# Patient Record
Sex: FEMALE | Race: AMERICAN INDIAN OR ALASKA NATIVE
[De-identification: names, ages, dates, MRNs, and addresses within clinical notes are randomized per-mention and may not be internally consistent; named-entity substitution may affect disease eponyms.]

---

## 2018-01-05 ENCOUNTER — HOSPITAL ENCOUNTER (OUTPATIENT)
Dept: HOSPITAL 5 - TRG | Age: 43
Discharge: HOME | End: 2018-01-05
Attending: OBSTETRICS & GYNECOLOGY
Payer: COMMERCIAL

## 2018-01-05 VITALS — DIASTOLIC BLOOD PRESSURE: 68 MMHG | SYSTOLIC BLOOD PRESSURE: 114 MMHG

## 2018-01-05 DIAGNOSIS — Z3A.37: ICD-10-CM

## 2018-01-05 DIAGNOSIS — O47.1: ICD-10-CM

## 2018-01-05 DIAGNOSIS — O09.523: Primary | ICD-10-CM

## 2018-01-05 PROCEDURE — 59025 FETAL NON-STRESS TEST: CPT

## 2018-01-05 PROCEDURE — 76815 OB US LIMITED FETUS(S): CPT

## 2018-01-05 PROCEDURE — 76819 FETAL BIOPHYS PROFIL W/O NST: CPT

## 2018-01-05 NOTE — ULTRASOUND REPORT
FINAL REPORT



EXAM:  US OB FETAL BPP WO NON-STRESS



HISTORY:  BPP 



TECHNIQUE:  Ultrasound examination of the gravid uterus for

biophysical profile evaluation of the fetus 



PRIORS:  None.



FINDINGS:  

There is a single viable intrauterine pregnancy with documented

cardiac activity. The amniotic fluid volume is normal.



Fetal heart rate:  123 bpm



Amniotic fluid: Single pocket with 6.5 cm vertical axis 



Fetal position:  Cephalic



Evaluation for biophysical profile yields the following score as

reported by technologist from real-time exam:



Fetal respiratory motion (minimum one episode):   2 



Gross body movement (minimum 3 movements):  2 



Fetal tone (minimum one flexion and extension):  2 



Amniotic fluid volume (at least 2 cm pocket in vertical

diameter): 2 



IMPRESSION:  

Single viable intrauterine pregnancy with 8/8 biophysical profile

score during the sonographic evaluation

## 2018-01-05 NOTE — ULTRASOUND REPORT
FINAL REPORT



EXAM:  US OB LIMITED



HISTORY:  SILVIO , reported gestational age 37 weeks 6 days 



TECHNIQUE:   Ultrasound evaluation of the gravid uterus 



PRIORS:  Biophysical profile 1/5/2018



FINDINGS:  

There is a single viable intrauterine pregnancy with documented

cardiac activity.  The quantity of visualized amniotic fluid

appears grossly normal.  



Heart rate:  131 beats per minute



Fetal position:  Cephalic



Amniotic fluid index:  20.0cm



IMPRESSION:  

Normal amniotic fluid index at 20.0 cm

## 2018-01-11 ENCOUNTER — HOSPITAL ENCOUNTER (INPATIENT)
Dept: HOSPITAL 5 - TRG | Age: 43
LOS: 2 days | Discharge: HOME | End: 2018-01-13
Attending: OBSTETRICS & GYNECOLOGY | Admitting: OBSTETRICS & GYNECOLOGY
Payer: COMMERCIAL

## 2018-01-11 DIAGNOSIS — Z3A.38: ICD-10-CM

## 2018-01-11 DIAGNOSIS — Z23: ICD-10-CM

## 2018-01-11 LAB
HCT VFR BLD CALC: 30.3 % (ref 30.3–42.9)
HCT VFR BLD CALC: 31.6 % (ref 30.3–42.9)
HGB BLD-MCNC: 10.6 GM/DL (ref 10.1–14.3)
HGB BLD-MCNC: 9.9 GM/DL (ref 10.1–14.3)
MCH RBC QN AUTO: 29 PG (ref 28–32)
MCHC RBC AUTO-ENTMCNC: 34 % (ref 30–34)
MCV RBC AUTO: 87 FL (ref 79–97)
PLATELET # BLD: 256 K/MM3 (ref 140–440)
RBC # BLD AUTO: 3.64 M/MM3 (ref 3.65–5.03)

## 2018-01-11 PROCEDURE — 36415 COLL VENOUS BLD VENIPUNCTURE: CPT

## 2018-01-11 PROCEDURE — 90715 TDAP VACCINE 7 YRS/> IM: CPT

## 2018-01-11 PROCEDURE — 90471 IMMUNIZATION ADMIN: CPT

## 2018-01-11 PROCEDURE — 85018 HEMOGLOBIN: CPT

## 2018-01-11 PROCEDURE — 85027 COMPLETE CBC AUTOMATED: CPT

## 2018-01-11 PROCEDURE — 86901 BLOOD TYPING SEROLOGIC RH(D): CPT

## 2018-01-11 PROCEDURE — 85014 HEMATOCRIT: CPT

## 2018-01-11 PROCEDURE — 86900 BLOOD TYPING SEROLOGIC ABO: CPT

## 2018-01-11 PROCEDURE — 86850 RBC ANTIBODY SCREEN: CPT

## 2018-01-11 RX ADMIN — SODIUM CHLORIDE, SODIUM LACTATE, POTASSIUM CHLORIDE, AND CALCIUM CHLORIDE SCH MLS/HR: .6; .31; .03; .02 INJECTION, SOLUTION INTRAVENOUS at 23:30

## 2018-01-11 RX ADMIN — SODIUM CHLORIDE, SODIUM LACTATE, POTASSIUM CHLORIDE, AND CALCIUM CHLORIDE SCH MLS/HR: .6; .31; .03; .02 INJECTION, SOLUTION INTRAVENOUS at 18:41

## 2018-01-11 NOTE — EVENT NOTE
Date: 01/11/18


Patient labor progressed slowly cervix now 7 cm.  Patient was only 6 cm 

approximately 3 hours ago at that point had a conversation with start Pitocin 

patient at that time declined had discussion with the risks of infection 

especially in the presence of a group B streptococcus patient has agreed to 

allow me to stop the Pitocin augmented.

## 2018-01-11 NOTE — HISTORY AND PHYSICAL REPORT
History of Present Illness


Date of examination: 18


Date of admission: 


18 16:35





History of present illness: 


Patient presented to labor and delivery with complaints of regular 

contractions.  Initial exam in triage revealed the patient will 4 cm dilated 

been admitted for active labor





Menstrual History 


Regularity: regular


Menses every: 26-28 days


Duration: 3


LMP: 04/15/2017


LMP reliability: month known


LMP character: normal


Pregnancy test type: urine test


BC at conception: none


Planned pregnancy? no





EDC Calculations 


LMP: 2018





EDC Confirmation:  2018








Past Pregnancy History 


   :      5


   Term Births:   2


   Living Children:   2


   Para:      2


   Aborta:      2


   Elect. Ab:      2





Pregnancy # 1


   Delivery type:     EAB





Pregnancy # 2


   Delivery type:     EAB





Pregnancy # 3


   Delivery date:     2005


   Weeks Gestation:   40


    labor:     no


   Delivery type:     


   Anesthesia type:     epidural


   Delivery location:     [facility]


   Infant Sex:      Female


   Birth weight:   6-4





Pregnancy # 4


   Delivery date:     2014


   Weeks Gestation:   40


    labor:     no


   Delivery type:     


   Delivery location:     [place]


   Infant Sex:      Female


   Birth weight:   7-9








Past Medical History:


   Negative Past Medical History





Past Surgical History:


   D&C x2





Past Medical History 


Anesthesia Complications: negative


Anemia: negative


Autoimmune Disorder: negative


Bleeding Disorder: negative


Blood Transfusions: negative


Breast Disease: negative


Diabetes: negative


Heart Disease: negative


Hypertension: negative


Hepatitis/Liver Disease: negative


Kidney Disease/UTI: negative


Neurologic/Epilepsy/Migraines: negative


Phlebitis/Varicosities: negative


Psychiatric: negative


Pulmonary Disease/Asthma: negative


Thyroid Disease: negative


Hospitalizations: negative


Surgery (Non-gyn): negative





Infection History 


Hx of STD: none


HIV Risk Eval: low risk


Hepatitis B Risk Eval: low risk


Personal hx. of genital herpes: no


Partner hx. of genital herpes: no


Varicella/Chicken Pox Status: Immunized


TB Risk: no





Genetic History 


ADVANCED MATERNAL AGE


Congenital Heart Defect:


    Mom: no  Dad: no


Canavan Disease:


    Mom: no  Dad: no


Thalassemia


    Mom: no  Dad: no


Neural Tube Defect


    Mom: no  Dad: no


Down's Syndrome


    Mom: no  Dad: no


Viral-Sachs


    Mom: no  Dad: no


Sickle Cell Disease/Trait


    Mom: no  Dad: no


Hemophilia


    Mom: no  Dad: no


Muscular Dystrophy


    Mom: no  Dad: no


Cystic Fibrosis


    Mom: no  Dad: no


Taye Chorea


    Mom: no  Dad: no


Mental Retardation


    Mom: no  Dad: no


Fragile X


    Mom: no  Dad: no


Other Genetic/Chromosomal Disorder


    Mom: no  Dad: no


Child w/other birth defect


    Mom: no  Dad: no





Enviromental Exposures 


Xray Exposure: no


Medication, drug, or alcohol use since LMP: no


Chemical/Other Exposure: no


Exposure to Cat Liter: no


Current Allergies (reviewed today):


No known allergies











Past History


Past Medical History: other (See HPI)


Past Surgical History: other (See HPI)


GYN History: other (See HPI)


Family/Genetic History: other (See HPI)


Social history: other (See HPI)





- Obstetrical History


Expected Date of Delivery: 18


Actual Gestation: 38 Week(s) 6 Day(s) 


: 5


Para: 2


Hx # Term Pregnancies: 2


Number of  Pregnancies: 0


Spontaneous Abortions: 0


Induced : 2


Number of Living Children: 2





Medications and Allergies


 Allergies











Allergy/AdvReac Type Severity Reaction Status Date / Time


 


No Known Allergies Allergy   Unverified 18 13:51














- Vital Signs


Vital signs: 


 Vital Signs











Pulse Pulse Ox


 


 84   100 


 


 18 15:08  18 15:08








 











Temp Pulse Resp BP Pulse Ox


 


 98.3 F   84   18   126/70   100 


 


 18 15:31  18 17:10  18 15:31  18 16:42  18 17:10














Results


Result Diagrams: 


 18 20:00





All other labs normal.








Assessment and Plan





- Patient Problems


(1) Advanced maternal age (AMA), 40 years or greater


Current Visit: Yes   Status: Acute   





(2) Active labor at term


Current Visit: Yes   Status: Acute   


Plan to address problem: 


Admit and follow routine labor protocol








(3) Group B streptococcal carriage complicating pregnancy


Current Visit: Yes   Status: Acute   


Plan to address problem: 


Will give prophylactic antibiotics

## 2018-01-12 LAB
HCT VFR BLD CALC: 29.2 % (ref 30.3–42.9)
HGB BLD-MCNC: 9.6 GM/DL (ref 10.1–14.3)

## 2018-01-12 RX ADMIN — IBUPROFEN SCH MG: 600 TABLET, FILM COATED ORAL at 11:07

## 2018-01-12 RX ADMIN — IBUPROFEN SCH MG: 600 TABLET, FILM COATED ORAL at 18:20

## 2018-01-12 RX ADMIN — IBUPROFEN SCH MG: 600 TABLET, FILM COATED ORAL at 04:09

## 2018-01-12 RX ADMIN — IBUPROFEN SCH MG: 600 TABLET, FILM COATED ORAL at 23:22

## 2018-01-12 NOTE — EVENT NOTE
Date: 01/12/18


 patient resting <8hr post vaginal delivery. no complaints, resting while 

infant rests. Lochia scant. Continue postpartum pathway.

## 2018-01-12 NOTE — PROCEDURE NOTE
OB Delivery Note





- Delivery


Date of Delivery: 18


Surgeon: MATEO CLEMENTS


Estimated blood loss: 300cc





- Vaginal


Delivery position: OA


Delivery augmentation: rupture of membranes, pitocin


Delivery monitor: external FHT, external uterine, internal FHT, internal uterine


Route of delivery: 


Delivery placenta: spontaneous


Episiotomy: none


Delivery laceration: none


Anesthesia: intravenous





- Infant


  ** A


Apgar at 1 minute: 8


Apgar at 5 minutes: 9

## 2018-01-13 VITALS — SYSTOLIC BLOOD PRESSURE: 128 MMHG | DIASTOLIC BLOOD PRESSURE: 60 MMHG

## 2018-01-13 PROCEDURE — 3E0234Z INTRODUCTION OF SERUM, TOXOID AND VACCINE INTO MUSCLE, PERCUTANEOUS APPROACH: ICD-10-PCS | Performed by: OBSTETRICS & GYNECOLOGY

## 2018-01-13 RX ADMIN — IBUPROFEN SCH MG: 600 TABLET, FILM COATED ORAL at 05:11

## 2018-01-13 NOTE — PROGRESS NOTE
Assessment and Plan





- Patient Problems


(1) Normal delivery at term


Current Visit: Yes   Status: Acute   


Plan to address problem: 


-ROUTINE PP CARE


-D/C HOME TODAY








Subjective





- Subjective


Date of service: 18


Principal diagnosis: PPD#1 S/P 


Interval history: 





DOING WELL. NO C/O THIS AM. 


Patient reports: appetite normal, voiding normally, pain well controlled


: doing well, nursing well





Objective





- Vital Signs


Latest vital signs: 


 Vital Signs











  Temp Pulse Resp BP


 


 18 00:50  98.4 F  63  18  126/67


 


 18 16:50  97.5 F L  84  18  126/67


 


 18 12:30  98.4 F  71  18  112/66








 Intake and Output











 18





 22:59 06:59 14:59


 


Intake Total 920 240 


 


Balance 920 240 


 


Intake:   


 


  Oral 920 240 


 


Other:   


 


  Total, Intake Amount 240 240 


 


  # Voids   


 


    Void 1 1 














- Exam


Breasts: Present: normal


Cardiovascular: Present: Normal S1, Normal S2


Lungs: Present: Clear to auscultation, Normal air movement


Abdomen: Present: normal appearance, soft, normal bowel sounds.  Absent: 

distention, tenderness, guarding


Extremities: Present: normal, edema (1+AT THE ANKLE B/L NO PAIN OR TENDERNESS 

OF CALFS).  Absent: tenderness


Deep Tendon Reflex Grade: Normal +2





- Labs


Labs: 


 Abnormal lab results











  18 Range/Units





  14:00 


 


Hgb  9.6 L  (10.1-14.3)  gm/dl


 


Hct  29.2 L  (30.3-42.9)  %

## 2018-01-13 NOTE — DISCHARGE SUMMARY
Providers





- Providers


Date of Admission: 


18 16:35





Date of discharge: 18


Attending physician: 


MATEO CLEMENTS





 





18 03:19


Consult to Lactation Consultant [CONS] Routine 


   Reason For Exam: assistance with breastfeeding, SNS











Primary care physician: 


ELDA PEREIRA








Hospitalization


Reason for admission: active labor


Delivery: 


Procedure details: 





SEE DELIVERY NOTE


Episiotomy: other (SEE DELIVERY NOTE)


Laceration: other (SEE DELIVERY NOTE)


Postpartum complications: none


Discharge diagnosis: IUP at term delivered


Levan baby: male


Hospital course: 





PT S/P  THAT WAS NOT COMPLICATED. PT HAS HAD ROUTINE PP CARE THAT ALSO HAS 

NOT BEEN COMPLICATED. PT WILL BE D/C HOME TODAY IF DESIRES TO DO SO.


Condition at discharge: Good


Disposition: DC-01 TO HOME OR SELFCARE





- Discharge Diagnoses


(1) Normal delivery at term


Status: Acute   





Plan





- Provider Discharge Summary


Activity: routine, no sex for 6 weeks, no heavy lifting 4 weeks, no strenuous 

exercise


Diet: routine


Instructions: routine


Additional instructions: 


[]  Smoking cessation referral if applicable(refer to patient education folder 

for contact #)


[]  Refer to [facility] Booklet








Call your doctor immediately for:


* Fever > 100.5


* Heavy vaginal bleeding ( >1 pad per hour)


* Severe persistent headache


* Shortness of breath


* Reddened, hot, painful area to leg or breast


* Drainage or odor from incision.





* Keep incision clean and dry at all times and follow doctor's instructions 

regarding bathing/showering











- Follow up plan


Follow up: 


ELDA PEREIRA MD [Primary Care Provider] - 7 Days

## 2018-05-14 ENCOUNTER — HOSPITAL ENCOUNTER (OUTPATIENT)
Dept: HOSPITAL 5 - SLR | Age: 43
Discharge: HOME | End: 2018-05-14
Attending: SPECIALIST
Payer: COMMERCIAL

## 2018-05-14 DIAGNOSIS — G47.30: Primary | ICD-10-CM

## 2018-05-14 PROCEDURE — G0399 HOME SLEEP TEST/TYPE 3 PORTA: HCPCS

## 2019-03-23 ENCOUNTER — HOSPITAL ENCOUNTER (EMERGENCY)
Dept: HOSPITAL 5 - ED | Age: 44
LOS: 1 days | Discharge: HOME | End: 2019-03-24
Payer: COMMERCIAL

## 2019-03-23 DIAGNOSIS — R07.89: Primary | ICD-10-CM

## 2019-03-23 DIAGNOSIS — R50.9: ICD-10-CM

## 2019-03-23 DIAGNOSIS — R05: ICD-10-CM

## 2019-03-23 DIAGNOSIS — R20.0: ICD-10-CM

## 2019-03-23 LAB
BASOPHILS # (AUTO): 0 K/MM3 (ref 0–0.1)
BASOPHILS NFR BLD AUTO: 0.8 % (ref 0–1.8)
EOSINOPHIL # BLD AUTO: 0.1 K/MM3 (ref 0–0.4)
EOSINOPHIL NFR BLD AUTO: 2 % (ref 0–4.3)
HCT VFR BLD CALC: 37.7 % (ref 30.3–42.9)
HGB BLD-MCNC: 12.6 GM/DL (ref 10.1–14.3)
LYMPHOCYTES # BLD AUTO: 1.9 K/MM3 (ref 1.2–5.4)
LYMPHOCYTES NFR BLD AUTO: 40.3 % (ref 13.4–35)
MCHC RBC AUTO-ENTMCNC: 33 % (ref 30–34)
MCV RBC AUTO: 87 FL (ref 79–97)
MONOCYTES # (AUTO): 0.4 K/MM3 (ref 0–0.8)
MONOCYTES % (AUTO): 9 % (ref 0–7.3)
PLATELET # BLD: 303 K/MM3 (ref 140–440)
RBC # BLD AUTO: 4.35 M/MM3 (ref 3.65–5.03)

## 2019-03-23 PROCEDURE — 83735 ASSAY OF MAGNESIUM: CPT

## 2019-03-23 PROCEDURE — 84484 ASSAY OF TROPONIN QUANT: CPT

## 2019-03-23 PROCEDURE — 36415 COLL VENOUS BLD VENIPUNCTURE: CPT

## 2019-03-23 PROCEDURE — 85025 COMPLETE CBC W/AUTO DIFF WBC: CPT

## 2019-03-23 PROCEDURE — 71046 X-RAY EXAM CHEST 2 VIEWS: CPT

## 2019-03-23 PROCEDURE — 93005 ELECTROCARDIOGRAM TRACING: CPT

## 2019-03-23 PROCEDURE — 80048 BASIC METABOLIC PNL TOTAL CA: CPT

## 2019-03-23 PROCEDURE — 85610 PROTHROMBIN TIME: CPT

## 2019-03-23 PROCEDURE — 85730 THROMBOPLASTIN TIME PARTIAL: CPT

## 2019-03-23 PROCEDURE — 93010 ELECTROCARDIOGRAM REPORT: CPT

## 2019-03-24 VITALS — DIASTOLIC BLOOD PRESSURE: 76 MMHG | SYSTOLIC BLOOD PRESSURE: 142 MMHG

## 2019-03-24 LAB
APTT BLD: 30.4 SEC. (ref 24.2–36.6)
BUN SERPL-MCNC: 13 MG/DL (ref 7–17)
BUN/CREAT SERPL: 19 %
CALCIUM SERPL-MCNC: 9.9 MG/DL (ref 8.4–10.2)
HEMOLYSIS INDEX: 12
INR PPP: 1 (ref 0.87–1.13)

## 2019-03-24 NOTE — EMERGENCY DEPARTMENT REPORT
ED Chest Pain HPI





- General


Chief Complaint: Dyspnea/Respdistress


Stated Complaint: RACHEL


Time Seen by Provider: 19 02:08


Source: patient


Mode of arrival: Ambulatory


Limitations: No Limitations





- History of Present Illness


Initial Comments: 


Patient is a 44-year-old emergency room female who presents for central chest 

pain radiating to left arm with some shortness of breath or today's symptoms are

exacerbated by movement and breathing and activity symptoms at rest pain is 

described as 5/10 sharp achy radiating from substrernal chest to left arm .  He 

states episode of nausea yesterday known nausea and vomiting there is no 

shortness of breath at this time no cough no fever no wheezing  patient denies 

history of hypertension hyperlipidemia or diabetes  states mother  from MI 

at age 75 


MD Complaint: chest pain


Onset/Timin


-: days(s)


Onset: during rest


Pain Location: substernal, left chest


Pain Radiation: LUE


Severity: moderate


Severity scale (0 -10): 5


Quality: aching, sharp


Consistency: constant


Improves With: nothing


Worsens With: palpation, movement


re: nausea


Other Symptoms: cough, fever.  denies: syncope, rash, acid taste in mouth, leg 

swelling, palpitations, burping


Treatments Prior to Arrival: none


Aspirin use within the Past 7 Days: (0) No





- Related Data


                                  Previous Rx's











 Medication  Instructions  Recorded  Last Taken  Type


 


Lidocain2.5%/Prilocai2.5% [Emla] 1 applic TP ONCE #1 tube 18 Unknown Rx


 


Naproxen 500 mg PO BID PRN #30 tablet 19 Unknown Rx











                                    Allergies











Allergy/AdvReac Type Severity Reaction Status Date / Time


 


No Known Allergies Allergy   Unverified 18 13:51














Heart Score





- HEART Score


History: Moderately suspicious


EKG: Normal


Age: 45-65


Risk factors: No known risk factors


Troponin: < normal limit


HEART Score: 2





ED Review of Systems


ROS: 


Stated complaint: RACHEL


Other details as noted in HPI





Constitutional: denies: chills, fever


Eyes: denies: eye pain, eye discharge, vision change


ENT: denies: ear pain, throat pain


Respiratory: cough


Cardiovascular: chest pain.  denies: edema, syncope, paroxysmal nocturnal 

dyspnea


Endocrine: no symptoms reported


Gastrointestinal: denies: abdominal pain, nausea, vomiting, diarrhea, 

constipation, hematemesis, melena, hematochezia


Genitourinary: denies: urgency, dysuria, discharge


Musculoskeletal: denies: back pain, joint swelling, arthralgia


Skin: denies: rash, lesions


Neurological: numbness (left arm ).  denies: headache, weakness, paresthesias


Psychiatric: denies: anxiety, depression


Hematological/Lymphatic: denies: easy bleeding, easy bruising





ED Past Medical Hx





- Past Medical History


Previous Medical History?: No


Hx Hypertension: No


Hx Congestive Heart Failure: No


Hx Diabetes: No


Hx Deep Vein Thrombosis: No


Hx Renal Disease: No


Hx Sickle Cell Disease: No


Hx Seizures: No


Hx Asthma: No


Hx COPD: No


Hx HIV: No





- Surgical History


Past Surgical History?: No





- Social History


Smoking Status: Never Smoker


Substance Use Type: None





- Medications


Home Medications: 


                                Home Medications











 Medication  Instructions  Recorded  Confirmed  Last Taken  Type


 


Lidocain2.5%/Prilocai2.5% [Emla] 1 applic TP ONCE #1 tube 18  Unknown Rx


 


Naproxen 500 mg PO BID PRN #30 tablet 19  Unknown Rx














ED Physical Exam





- General


Limitations: No Limitations


General appearance: alert, in no apparent distress





- Head


Head exam: Present: atraumatic, normocephalic





- Eye


Eye exam: Present: normal appearance, PERRL





- ENT


ENT exam: Present: mucous membranes moist.  Absent: normal orophraynx, TM's 

normal bilaterally, normal external ear exam





- Neck


Neck exam: Present: normal inspection, full ROM, lymphadenopathy.  Absent: 

tenderness, meningismus, thyromegaly





- Expanded Neck Exam


  ** Expanded


Neck exam: Absent: tenderness, midline deformity, anterior neck swelling, 

thyroid mass, carotid bruit, tracheal deviation





- Respiratory


Respiratory exam: Present: normal lung sounds bilaterally.  Absent: respiratory 

distress, wheezes, stridor, chest wall tenderness





- Cardiovascular


Cardiovascular Exam: Present: regular rate, normal rhythm, normal heart sounds. 

 Absent: systolic murmur, diastolic murmur, rubs, gallop





- GI/Abdominal


GI/Abdominal exam: Present: soft, normal bowel sounds.  Absent: distended, 

tenderness, guarding, rebound, bruit, hernia





- Rectal


Rectal exam: Present: deferred





- Extremities Exam


Extremities exam: Present: normal inspection, full ROM, normal capillary refill.

  Absent: tenderness, pedal edema, joint swelling, calf tenderness





- Back Exam


Back exam: Present: normal inspection, full ROM.  Absent: tenderness, CVA 

tenderness (R), CVA tenderness (L), muscle spasm, paraspinal tenderness, 

vertebral tenderness, rash noted





- Neurological Exam


Neurological exam: Present: alert, oriented X3, CN II-XII intact, normal gait, 

reflexes normal





- Psychiatric


Psychiatric exam: Present: normal affect, normal mood





- Skin


Skin exam: Present: warm, dry, intact, normal color.  Absent: rash





ED Course


                                   Vital Signs











  19





  23:16


 


Temperature 98 F


 


Pulse Rate 62


 


Respiratory 18





Rate 


 


Blood Pressure 150/85


 


O2 Sat by Pulse 100





Oximetry 














ABDI score





- Abdi Score


Age > 65: (0) No


Aspirin use within the Past 7 Days: (0) No


3 or more CAD Risk Factors: (0) No


2 or more Angina events in past 24 hrs: (0) No


Known CAD with more than 50% Stenosis: (0) No


Elevated Cardiac Markers: (0) No


ST Deviation Greater than 0.5mm: (0) No


ABDI Score: 0





ED Medical Decision Making





- Lab Data


Result diagrams: 


                                 19 23:51





                                 19 23:51








Labs











  19





  23:51 23:51 02:23


 


WBC  4.7  


 


RBC  4.35  


 


Hgb  12.6  


 


Hct  37.7  


 


MCV  87  


 


MCH  29  


 


MCHC  33  


 


RDW  13.6  


 


Plt Count  303  


 


Lymph % (Auto)  40.3 H  


 


Mono % (Auto)  9.0 H  


 


Eos % (Auto)  2.0  


 


Baso % (Auto)  0.8  


 


Lymph #  1.9  


 


Mono #  0.4  


 


Eos #  0.1  


 


Baso #  0.0  


 


Seg Neutrophils %  47.9  


 


Seg Neutrophils #  2.2  


 


PT    13.8


 


INR    1.00


 


APTT    30.4


 


Sodium   140 


 


Potassium   4.2 


 


Chloride   101.2 


 


Carbon Dioxide   28 


 


Anion Gap   15 


 


BUN   13 


 


Creatinine   0.7 


 


Estimated GFR   > 60 


 


BUN/Creatinine Ratio   19 


 


Glucose   99 


 


Calcium   9.9 


 


Magnesium   


 


Troponin T   < 0.010 














  19





  02:23


 


WBC 


 


RBC 


 


Hgb 


 


Hct 


 


MCV 


 


MCH 


 


MCHC 


 


RDW 


 


Plt Count 


 


Lymph % (Auto) 


 


Mono % (Auto) 


 


Eos % (Auto) 


 


Baso % (Auto) 


 


Lymph # 


 


Mono # 


 


Eos # 


 


Baso # 


 


Seg Neutrophils % 


 


Seg Neutrophils # 


 


PT 


 


INR 


 


APTT 


 


Sodium 


 


Potassium 


 


Chloride 


 


Carbon Dioxide 


 


Anion Gap 


 


BUN 


 


Creatinine 


 


Estimated GFR 


 


BUN/Creatinine Ratio 


 


Glucose 


 


Calcium 


 


Magnesium  2.10


 


Troponin T  < 0.010














- EKG Data


EKG shows normal: sinus rhythm, axis, intervals, QRS complexes, ST-T waves


Rate: normal





- EKG Data


When compared to previous EKG there are: previous EKG unavailable


Interpretation: normal EKG, LVH





- Radiology Data


Radiology results: report reviewed, image reviewed


Normal CXR no infiltrates no opacities








- Medical Decision Making


Downs score is 1 , ABDI score is 0, t troponin troponin less than 0.012 , EKG is

 normal sinus rhythm improved with , medication plan proceed and naproxen when 

necessary chest wall pain follow with PCP O2 to 3 days patient is given referral

 to cardiology for evaluation as requested DC to home with a diagnosis atypical 

chest pain chest wall pain this pain is reproducible with deep palpation to the 

left lateral chest wall there is no shortness of breath there is no wheezing O2 

sat is 100% on room air PERC PE score is 0 patient verbalized agreement and 

understanding of the discharge plan follow up with PCP and cardiology as 

directed 


Critical care attestation.: 


If time is entered above; I have spent that time in minutes in the direct care 

of this critically ill patient, excluding procedure time.








ED Disposition


Clinical Impression: 


 Atypical chest pain, Chest wall pain





Disposition: DC-01 TO HOME OR SELFCARE


Is pt being admited?: No


Does the pt Need Aspirin: No


Condition: Stable


Instructions:  Chest Pain (ED), Costochondritis (ED)


Prescriptions: 


Naproxen 500 mg PO BID PRN #30 tablet


 PRN Reason: pain


Referrals: 


[facility], MD [Primary Care Provider] - 3-5 Days


JUAN BLANKENSHIP MD [Staff Physician] - 3-5 Days


Forms:  Work/School Release Form(ED)


Time of Disposition: 04:30

## 2019-03-24 NOTE — XRAY REPORT
PROCEDURE:  XR CHEST ROUTINE 2V 

 

TECHNIQUE:  PA and lateral chest radiographs were obtained.  

 

HISTORY:  chest pain  

 

COMPARISONS:  None. 

 

FINDINGS: 

 

Heart:  Normal. 

Mediastinum/Vessels: Normal. 

Lungs/Pleural space:  Normal. 

Bony thorax: No acute osseous abnormality. 

 

 

IMPRESSION:  Normal examination. 

 

This document is electronically signed by Tracy Robbins DO., March 24 2019 03:51:33 AM ET